# Patient Record
Sex: FEMALE | Race: BLACK OR AFRICAN AMERICAN | NOT HISPANIC OR LATINO | Employment: OTHER | URBAN - METROPOLITAN AREA
[De-identification: names, ages, dates, MRNs, and addresses within clinical notes are randomized per-mention and may not be internally consistent; named-entity substitution may affect disease eponyms.]

---

## 2020-10-21 ENCOUNTER — OFFICE VISIT (OUTPATIENT)
Dept: OPHTHALMOLOGY | Facility: CLINIC | Age: 85
End: 2020-10-21
Payer: MEDICARE

## 2020-10-21 DIAGNOSIS — Z01.818 PRE-OP TESTING: ICD-10-CM

## 2020-10-21 DIAGNOSIS — H18.20 CORNEAL EDEMA: Primary | ICD-10-CM

## 2020-10-21 PROCEDURE — 99999 PR PBB SHADOW E&M-EST. PATIENT-LVL III: CPT | Mod: PBBFAC,,, | Performed by: OPHTHALMOLOGY

## 2020-10-21 PROCEDURE — 92004 PR EYE EXAM, NEW PATIENT,COMPREHESV: ICD-10-PCS | Mod: S$PBB,,, | Performed by: OPHTHALMOLOGY

## 2020-10-21 PROCEDURE — 99999 PR PBB SHADOW E&M-EST. PATIENT-LVL III: ICD-10-PCS | Mod: PBBFAC,,, | Performed by: OPHTHALMOLOGY

## 2020-10-21 PROCEDURE — 92004 COMPRE OPH EXAM NEW PT 1/>: CPT | Mod: S$PBB,,, | Performed by: OPHTHALMOLOGY

## 2020-10-21 PROCEDURE — 99213 OFFICE O/P EST LOW 20 MIN: CPT | Mod: PBBFAC | Performed by: OPHTHALMOLOGY

## 2020-10-21 RX ORDER — CALCIUM CARBONATE 600 MG
600 TABLET ORAL DAILY
COMMUNITY

## 2020-10-21 RX ORDER — SODIUM CHLORIDE 0.9 % (FLUSH) 0.9 %
10 SYRINGE (ML) INJECTION
Status: SHIPPED | OUTPATIENT
Start: 2020-10-21

## 2020-10-21 RX ORDER — TETRACAINE HYDROCHLORIDE 5 MG/ML
1 SOLUTION OPHTHALMIC
Status: CANCELLED | OUTPATIENT
Start: 2020-10-21

## 2020-10-21 RX ORDER — VIT C/E/ZN/COPPR/LUTEIN/ZEAXAN 250MG-90MG
1000 CAPSULE ORAL DAILY
COMMUNITY

## 2020-10-21 RX ORDER — OFLOXACIN 3 MG/ML
1 SOLUTION/ DROPS OPHTHALMIC 4 TIMES DAILY
Qty: 1 BOTTLE | Refills: 4 | Status: SHIPPED | OUTPATIENT
Start: 2020-10-21 | End: 2022-10-06 | Stop reason: ALTCHOICE

## 2020-10-21 RX ORDER — LORATADINE 10 MG/1
10 TABLET ORAL DAILY
COMMUNITY

## 2020-10-21 RX ORDER — FLUTICASONE PROPIONATE 50 MCG
2 SPRAY, SUSPENSION (ML) NASAL DAILY
COMMUNITY
Start: 2019-12-16 | End: 2020-12-15

## 2020-10-21 RX ORDER — MULTIVIT WITH IRON,MINERALS
1 TABLET,CHEWABLE ORAL DAILY
COMMUNITY

## 2020-10-21 RX ORDER — MOXIFLOXACIN 5 MG/ML
1 SOLUTION/ DROPS OPHTHALMIC
Status: CANCELLED | OUTPATIENT
Start: 2020-10-21

## 2020-10-21 RX ORDER — ASPIRIN 81 MG/1
81 TABLET ORAL DAILY
COMMUNITY

## 2020-10-21 RX ORDER — PREDNISOLONE ACETATE 10 MG/ML
1 SUSPENSION/ DROPS OPHTHALMIC 4 TIMES DAILY
Qty: 10 ML | Refills: 4 | Status: SHIPPED | OUTPATIENT
Start: 2020-10-21 | End: 2022-10-06 | Stop reason: ALTCHOICE

## 2020-10-21 RX ORDER — ZINC GLUCONATE 50 MG
50 TABLET ORAL DAILY
COMMUNITY

## 2020-10-21 RX ORDER — TIMOLOL MALEATE 5 MG/ML
1 SOLUTION/ DROPS OPHTHALMIC DAILY
COMMUNITY
Start: 2020-10-16

## 2020-10-21 RX ORDER — GLUCOSAM/CHONDRO/HERB 149/HYAL 750-100 MG
1 TABLET ORAL DAILY
COMMUNITY

## 2020-10-21 NOTE — LETTER
October 21, 2020      Clayton Hussein MD  [address] [facility]  Eye Care & Surgery [facility] [place] [address]           [facility] 1st Fl  [address]  [facility] [address]  Phone: [phone]  Fax: [phone]          Patient: Jojo Pual   MR Number: [id number]   YOB: 1929   Date of Visit: 10/21/2020       Dear Dr. Clayton Hussein:    Thank you for referring Jojo Paul to me for evaluation. Attached you will find relevant portions of my assessment and plan of care.    If you have questions, please do not hesitate to call me. I look forward to following Jojo Paul along with you.    Sincerely,    Lelo Escalante MD    Enclosure  CC:  No Recipients    If you would like to receive this communication electronically, please contact [email or web address] or [phone] to request more information on [employer] Link access.    For providers and/or their staff who would like to refer a patient to [facility], please contact us through our one-stop-shop provider referral line, [facility], at [phone].    If you feel you have received this communication in error or would no longer like to receive these types of communications, please e-mail [email or web address]

## 2020-10-21 NOTE — PROGRESS NOTES
HPI     89 YO female here with niece for OS eval DSEK per Dr. Hussein. Pt reports   cataract sx ou and glaucoma laser OU. Pt states she notices an increase in   floaters for past few months. Hx of eye infection OD--cured with abx gtts.   Pt states she has dry eyes but does not use eye gtts for this. Referred   here for dsek os.     Timolol OU qd    Last edited by Marisa Bueno on 10/21/2020  2:47 PM. (History)            Assessment /Plan     For exam results, see Encounter Report.    Corneal edema      Clinically significant corneal edema is present, which affects vision and activities of daily living. Risks, benefits, and alternatives to surgery were discussed and patient voices understanding.    DMEK left eye

## 2020-10-23 ENCOUNTER — TELEPHONE (OUTPATIENT)
Dept: OPHTHALMOLOGY | Facility: CLINIC | Age: 85
End: 2020-10-23

## 2020-10-23 NOTE — TELEPHONE ENCOUNTER
I spoke to patient.  She wants to cancel surgery.  Feels she is too old to pursue surgery.  Will tin back if she changes mind.

## 2022-10-06 ENCOUNTER — OFFICE VISIT (OUTPATIENT)
Dept: OPHTHALMOLOGY | Facility: CLINIC | Age: 87
End: 2022-10-06
Payer: MEDICARE

## 2022-10-06 DIAGNOSIS — H18.20 CORNEAL EDEMA: Primary | ICD-10-CM

## 2022-10-06 PROCEDURE — 99214 OFFICE O/P EST MOD 30 MIN: CPT | Mod: S$PBB,,, | Performed by: OPHTHALMOLOGY

## 2022-10-06 PROCEDURE — 92071 CONTACT LENS FITTING FOR TX: CPT | Mod: PBBFAC | Performed by: OPHTHALMOLOGY

## 2022-10-06 PROCEDURE — 92071 CONTACT LENS FITTING FOR TX: CPT | Mod: S$PBB,LT,, | Performed by: OPHTHALMOLOGY

## 2022-10-06 PROCEDURE — 99212 OFFICE O/P EST SF 10 MIN: CPT | Mod: PBBFAC | Performed by: OPHTHALMOLOGY

## 2022-10-06 PROCEDURE — 99214 PR OFFICE/OUTPT VISIT, EST, LEVL IV, 30-39 MIN: ICD-10-PCS | Mod: S$PBB,,, | Performed by: OPHTHALMOLOGY

## 2022-10-06 PROCEDURE — 92071 PR CONTACT LENS FITTING FOR TX: ICD-10-PCS | Mod: S$PBB,LT,, | Performed by: OPHTHALMOLOGY

## 2022-10-06 PROCEDURE — 99999 PR PBB SHADOW E&M-EST. PATIENT-LVL II: CPT | Mod: PBBFAC,,, | Performed by: OPHTHALMOLOGY

## 2022-10-06 PROCEDURE — 99999 PR PBB SHADOW E&M-EST. PATIENT-LVL II: ICD-10-PCS | Mod: PBBFAC,,, | Performed by: OPHTHALMOLOGY

## 2022-10-06 RX ORDER — OFLOXACIN 3 MG/ML
1 SOLUTION/ DROPS OPHTHALMIC 2 TIMES DAILY
Qty: 5 ML | Refills: 11 | Status: SHIPPED | OUTPATIENT
Start: 2022-10-06 | End: 2022-10-16

## 2022-10-06 RX ORDER — LATANOPROST 50 UG/ML
1 SOLUTION/ DROPS OPHTHALMIC NIGHTLY
COMMUNITY
Start: 2022-09-26

## 2022-10-06 RX ORDER — FLUTICASONE PROPIONATE 50 MCG
2 SPRAY, SUSPENSION (ML) NASAL DAILY
COMMUNITY
Start: 2022-04-14

## 2022-10-06 RX ORDER — MULTIVITAMIN
1 TABLET ORAL
COMMUNITY

## 2022-10-06 RX ORDER — TOBRAMYCIN AND DEXAMETHASONE 3; 1 MG/ML; MG/ML
1 SUSPENSION/ DROPS OPHTHALMIC 4 TIMES DAILY
COMMUNITY
Start: 2022-09-08 | End: 2023-04-27 | Stop reason: ALTCHOICE

## 2022-10-06 NOTE — PROGRESS NOTES
"HPI    Referred by Dr. Mai     Pbk OS  S/p CE OU x 15+yrs  S/p Laser for Glaucoma OU x 20+yrs     Emre 128 5% tid OS  Emre 5% 128 OS gtts and lenny   Latanoprost OU qhs  Tobradex OS QID   Refresh tears when wakes during the night.       Corneal eval   89 yr old pt presents for evaluation for DSEK. She states that a year ago   she had an appt with Dr. Escalante for surgery and was nervous so she canceled.    Now left eye vision is not as good, but still able to function. She is   bothered by glare form the sun.  Uses the Emre 128 often throughout the   day           Last edited by Morenita Ahn MD on 10/6/2022  4:52 PM.            Assessment /Plan     For exam results, see Encounter Report.    Corneal edema    Other orders  -     ofloxacin (OCUFLOX) 0.3 % ophthalmic solution; Place 1 drop into the left eye 2 (two) times a day. for 10 days  Dispense: 5 mL; Refill: 11    PBK OS - causing blurred vision and foreign body sensation  - pt hesitant to proceed with surgery, more concerned about the grit" sensation   - will place a bandage contact lens today, stop TobraDex, start Ocuflox b.i.d.    Will call patient in a few weeks to see how left eye is feeling.  If she is feeling better, okay to follow up with Dr. Mai monthly for a contact lens changes    S/p CE OU x 15+yrs    S/p Laser for Glaucoma OU x 20+yrs   - needs DFE / nerve exame               "

## 2022-10-13 ENCOUNTER — TELEPHONE (OUTPATIENT)
Dept: OPHTHALMOLOGY | Facility: CLINIC | Age: 87
End: 2022-10-13
Payer: MEDICARE

## 2022-10-27 ENCOUNTER — OFFICE VISIT (OUTPATIENT)
Dept: OPHTHALMOLOGY | Facility: CLINIC | Age: 87
End: 2022-10-27
Payer: MEDICARE

## 2022-10-27 DIAGNOSIS — H18.20 CORNEAL EDEMA: Primary | ICD-10-CM

## 2022-10-27 PROCEDURE — 92071 PR CONTACT LENS FITTING FOR TX: ICD-10-PCS | Mod: S$PBB,LT,, | Performed by: OPHTHALMOLOGY

## 2022-10-27 PROCEDURE — 99214 OFFICE O/P EST MOD 30 MIN: CPT | Mod: S$PBB,,, | Performed by: OPHTHALMOLOGY

## 2022-10-27 PROCEDURE — 92071 CONTACT LENS FITTING FOR TX: CPT | Mod: S$PBB,LT,, | Performed by: OPHTHALMOLOGY

## 2022-10-27 PROCEDURE — 92071 CONTACT LENS FITTING FOR TX: CPT | Mod: PBBFAC,27 | Performed by: OPHTHALMOLOGY

## 2022-10-27 PROCEDURE — 99214 OFFICE O/P EST MOD 30 MIN: CPT | Mod: PBBFAC

## 2022-10-27 PROCEDURE — 99214 PR OFFICE/OUTPT VISIT, EST, LEVL IV, 30-39 MIN: ICD-10-PCS | Mod: S$PBB,,, | Performed by: OPHTHALMOLOGY

## 2022-10-27 NOTE — PROGRESS NOTES
"HPI    Referred by Dr. Willis     Pbk OS  S/p CE OU x 15+yrs  S/p Laser for Glaucoma OU x 20+yrs     Emre 128 5% tid OS  Emre 5% 128 OS gtts and lenny   Latanoprost OU qhs  ofloxacin OS QID   Refresh tears when wakes during the night.     Pt here for f/u per dr wilils  Pt was doing better after completing course of ofloxacin but irritation   started back up 2 days later          Last edited by Alissa Lilly on 10/27/2022  1:16 PM.            Assessment /Plan     For exam results, see Encounter Report.    Corneal edema      PBK OS - causing blurred vision and foreign body sensation  - pt hesitant to proceed with surgery, more concerned about the grit" sensation   - was initially doing well with a bandage contact lens - however more FBS now -- suspect due to tight fitting lens.  - will replace with a new bandage contact lens today OS.  Continue Ocuflox     Will call patient in a few weeks to see how left eye is feeling.  If she is feeling better, okay to follow up with Dr. Willis monthly for a contact lens changes    S/p CE OU x 15+yrs    S/p Laser for Glaucoma OU x 20+yrs   - needs DFE / nerve exame                   "

## 2022-12-05 ENCOUNTER — TELEPHONE (OUTPATIENT)
Dept: OPHTHALMOLOGY | Facility: CLINIC | Age: 87
End: 2022-12-05
Payer: MEDICARE

## 2022-12-05 NOTE — TELEPHONE ENCOUNTER
----- Message from Anabella Kwan sent at 12/5/2022  1:21 PM CST -----  Regarding: urgent appt  Patient states that she had a  lot of issues with her eye over the weekend.  Her left eye is very swollen where she is unable to open it and it has a lot of discharge.

## 2022-12-05 NOTE — TELEPHONE ENCOUNTER
Pt states she has a lot of discharge and her left eye is swollen. She states she is using her Emre 128, Ofloxacin, and Refresh with very little relief. Appt made.

## 2022-12-09 ENCOUNTER — OFFICE VISIT (OUTPATIENT)
Dept: OPHTHALMOLOGY | Facility: CLINIC | Age: 87
End: 2022-12-09
Payer: MEDICARE

## 2022-12-09 DIAGNOSIS — H18.20 CORNEAL EDEMA: Primary | ICD-10-CM

## 2022-12-09 PROCEDURE — 99214 PR OFFICE/OUTPT VISIT, EST, LEVL IV, 30-39 MIN: ICD-10-PCS | Mod: S$PBB,,, | Performed by: OPHTHALMOLOGY

## 2022-12-09 PROCEDURE — 99214 OFFICE O/P EST MOD 30 MIN: CPT | Mod: S$PBB,,, | Performed by: OPHTHALMOLOGY

## 2022-12-09 PROCEDURE — 92071 CONTACT LENS FITTING FOR TX: CPT | Mod: S$PBB,LT,, | Performed by: OPHTHALMOLOGY

## 2022-12-09 PROCEDURE — 99999 PR PBB SHADOW E&M-EST. PATIENT-LVL III: CPT | Mod: PBBFAC,,, | Performed by: OPHTHALMOLOGY

## 2022-12-09 PROCEDURE — 99999 PR PBB SHADOW E&M-EST. PATIENT-LVL III: ICD-10-PCS | Mod: PBBFAC,,, | Performed by: OPHTHALMOLOGY

## 2022-12-09 PROCEDURE — 92071 CONTACT LENS FITTING FOR TX: CPT | Mod: PBBFAC,27,LT | Performed by: OPHTHALMOLOGY

## 2022-12-09 PROCEDURE — 99213 OFFICE O/P EST LOW 20 MIN: CPT | Mod: PBBFAC,25,27 | Performed by: OPHTHALMOLOGY

## 2022-12-09 PROCEDURE — 92071 PR CONTACT LENS FITTING FOR TX: ICD-10-PCS | Mod: S$PBB,LT,, | Performed by: OPHTHALMOLOGY

## 2022-12-09 NOTE — PROGRESS NOTES
"HPI    Referred by Dr. Mai     Pbk OS   S/p CE OU x 15+yrs   S/p Laser for Glaucoma OU x 20+yrs     Emre 128 5% QID OS   Latanoprost OU qhs   Timolol qd OU   Ofloxacin QID OS   Refresh tears 6+ times OU     89 y.o. female is here for corneal edema, left eye. Last Friday left eye   started to become bothersome and expericing eye pain and swelling. FB   sensation, left eye. Pt states that the eye was so swollen she could not   open the lids, left eye with her hand. Itching. Do have trouble with   glare.     Last edited by BERTIN Montgomery on 12/9/2022  1:46 PM.            Assessment /Plan     For exam results, see Encounter Report.    Corneal edema          PBK OS - causing blurred vision and foreign body sensation  - pt hesitant to proceed with surgery, more concerned about the grit" sensation   - does well with a bandage contact lens for 4 wks then it becomes tight fitting with pain.    -->  will replace with a new bandage contact lens today OS.  Continue Ocuflox BID.       follow up with Dr. Mai monthly for a contact lens changes    S/p CE OU x 15+yrs    S/p Laser for Glaucoma OU x 20+yrs   - needs DFE / nerve exame                     "

## 2022-12-09 NOTE — LETTER
"     [facility] - 10th Fl  [address]  [facility] [address]  Phone: [phone]  Fax: [phone]   December 9, 2022    Ac Mai MD  [facility]  [address]  [place] [address]    Patient: Jojo Paul   MR Number: [id number]   YOB: 1929   Date of Visit: 12/9/2022       Dear Dr. Mai:    Thank you for referring Jojo Paul to me for evaluation. Here is my assessment and plan of care:    PBK OS - causing blurred vision and foreign body sensation  - pt hesitant to proceed with surgery, more concerned about the grit" sensation   - does well with a bandage contact lens for 4 wks then it becomes tight fitting with pain.    -->  will replace with a new bandage contact lens today OS.  Continue Ocuflox BID.       follow up with Dr. Mai monthly for a contact lens changes    S/p CE OU x 15+yrs    S/p Laser for Glaucoma OU x 20+yrs   - needs DFE / nerve exame    Below you will find my full exam findings. If you have questions, please do not hesitate to call me. I look forward to following Ms. Jojo Paul along with you.    Sincerely,        Morenita Ahn MD       CC  No Recipients             Base Eye Exam       Visual Acuity (Snellen - Linear)         Right Left    Dist cc 20/40 -2 CF @ Face      Correction: Glasses, Contacts   Bandage CTL, left eye             Tonometry (Tonopen, 2:10 PM)         Right Left    Pressure  12              Neuro/Psych       Oriented x3: Yes    Mood/Affect: Normal                  Slit Lamp and Fundus Exam       Slit Lamp Exam         Right Left    Lids/Lashes ptosis  ptosis, bleph, edema    Conjunctiva/Sclera White and quiet Injection    Cornea Clear edema, haze 2+ folds, bcl    Anterior Chamber Deep and quiet Deep and quiet    Iris Round and reactive Round and reactive    Lens PCIOL PCIOL                     "

## 2022-12-16 ENCOUNTER — TELEPHONE (OUTPATIENT)
Dept: OPHTHALMOLOGY | Facility: CLINIC | Age: 87
End: 2022-12-16
Payer: MEDICARE

## 2022-12-19 ENCOUNTER — TELEPHONE (OUTPATIENT)
Dept: OPHTHALMOLOGY | Facility: CLINIC | Age: 87
End: 2022-12-19
Payer: MEDICARE

## 2022-12-21 ENCOUNTER — TELEPHONE (OUTPATIENT)
Dept: OPHTHALMOLOGY | Facility: CLINIC | Age: 87
End: 2022-12-21
Payer: MEDICARE

## 2022-12-21 NOTE — TELEPHONE ENCOUNTER
----- Message from Renea Brewer sent at 12/21/2022  3:19 PM CST -----  Regarding: Questions  Pt wants to speak with a tech. They had a missed call from Optometry today. Pt wants to know if there is anything important they need to know from Dr. Ahn.      Jojo @  [phone]

## 2022-12-23 ENCOUNTER — OFFICE VISIT (OUTPATIENT)
Dept: OPHTHALMOLOGY | Facility: CLINIC | Age: 87
End: 2022-12-23
Payer: MEDICARE

## 2022-12-23 DIAGNOSIS — H18.20 CORNEAL EDEMA: Primary | ICD-10-CM

## 2022-12-23 PROCEDURE — 99214 PR OFFICE/OUTPT VISIT, EST, LEVL IV, 30-39 MIN: ICD-10-PCS | Mod: S$PBB,,, | Performed by: OPHTHALMOLOGY

## 2022-12-23 PROCEDURE — 99214 OFFICE O/P EST MOD 30 MIN: CPT | Mod: S$PBB,,, | Performed by: OPHTHALMOLOGY

## 2022-12-23 NOTE — PROGRESS NOTES
"HPI    Referred by Dr. Mai     Pbk OS   S/p CE OU x 15+yrs   S/p Laser for Glaucoma OU x 20+yrs     Emre 128 5% QID OS --> PRN  Latanoprost OU qhs   Timolol qd OU   Ofloxacin BID OS   Refresh tears 6+ times OU     Pt here to discuss OS eye problem. FBS OS. Pt states that the eye was so   swollen she could not open the lid.     Last edited by Alissa Lilly on 12/23/2022  2:15 PM.            Assessment /Plan     For exam results, see Encounter Report.    Corneal edema        PBK OS - causing blurred vision and foreign body sensation  - pt hesitant to proceed with surgery, more concerned about the grit" sensation   - does well with a bandage contact lens for 4 wks then it becomes tight fitting with pain.    BCL that was placed at last visit as fallen out -- will replace today    -->  will replace with a new bandage contact lens today OS.  Continue Ocuflox BID.       follow up with Dr. Mai monthly or (q3 wk) -- contact lens changes    S/p CE OU x 15+yrs    S/p Laser for Glaucoma OU x 20+yrs   - needs DFE / nerve exam                     "

## 2023-03-01 ENCOUNTER — TELEPHONE (OUTPATIENT)
Dept: OPHTHALMOLOGY | Facility: CLINIC | Age: 88
End: 2023-03-01
Payer: MEDICARE

## 2023-03-01 NOTE — TELEPHONE ENCOUNTER
----- Message from Renea Brewer sent at 3/1/2023  1:18 PM CST -----  Regarding: Appointment  Pt wants to schedule a follow up appt before 03/31. They were advised by Dr. Ac Phelan to see Dr. Ahn before they see him. Please contact pt to assist.      Jojo @ [phone]

## 2023-03-06 ENCOUNTER — OFFICE VISIT (OUTPATIENT)
Dept: OPHTHALMOLOGY | Facility: CLINIC | Age: 88
End: 2023-03-06
Payer: MEDICARE

## 2023-03-06 ENCOUNTER — TELEPHONE (OUTPATIENT)
Dept: OPTOMETRY | Facility: CLINIC | Age: 88
End: 2023-03-06
Payer: MEDICARE

## 2023-03-06 DIAGNOSIS — H18.20 CORNEAL EDEMA: Primary | ICD-10-CM

## 2023-03-06 PROCEDURE — 99214 OFFICE O/P EST MOD 30 MIN: CPT | Mod: S$PBB,,, | Performed by: OPHTHALMOLOGY

## 2023-03-06 PROCEDURE — 99214 PR OFFICE/OUTPT VISIT, EST, LEVL IV, 30-39 MIN: ICD-10-PCS | Mod: S$PBB,,, | Performed by: OPHTHALMOLOGY

## 2023-03-06 NOTE — TELEPHONE ENCOUNTER
----- Message from Maxine Radford OD sent at 3/6/2023  4:28 PM CST -----  Regarding: RE: 1 mo clfit  3/30 in the PM is fine just let me know what time she wants and I will put her down!  ----- Message -----  From: Molly Erickson  Sent: 3/6/2023   4:02 PM CST  To: Maxine Radford OD  Subject: FW: 1 mo clfit                                   Hey doc, I don't see any spots available on the 30 for a fit, just a few follow up spots. Is there another date that would work best for you? Just wanted to ask because I know it has to be on certain date and times as well as double booked.    ----- Message -----  From: Gil Joseph MA  Sent: 3/6/2023   3:32 PM CST  To: Maxine Radford Staff  Subject: 1 mo clfit                                       Patient needs a schedule a clfit per Anabelle in 1 month. Pt is interested in scheduling appointment for 03/30/23 in the afternoon. Please call [phone] to schedule.       Thanks,  Gil

## 2023-03-06 NOTE — PROGRESS NOTES
"HPI    Referred by Dr. Mai     Pbk OS   S/p CE OU x 15+yrs   S/p Laser for Glaucoma OU x 20+yrs     Emre 128 5% QID OS --> PRN  Latanoprost OU qhs   Timolol qd OU   Ofloxacin BID OS   Refresh tears 6+ times OU     Pt here today for corneal edema f/u. Pt denies eye pain but there is some   discomfort. Pt states vision is blurry, sensitive to light, and watering.   Pt states the there has been no change to symptoms. Pt stated the bandaged   CL fell out and last Monday Dr. Mai put a new one in.  Last edited by Nicky Cuevas MA on 3/6/2023 11:38 AM.            Assessment /Plan     For exam results, see Encounter Report.    Corneal edema          PBK OS - causing blurred vision and foreign body sensation  - pt does not want surgery, more concerned about the grit" sensation   - does well when BCL fitting well -- sometimes it moves around/ falls out     Continue Ocuflox 2-4 x/day    F/up optom to check BCL fit, maybe needs different size/D     follow up with Dr. Mai monthly or (q3 wk) -- contact lens changes    S/p CE OU x 15+yrs    S/p Laser for Glaucoma OU x 20+yrs   - needs DFE / nerve exam                   "

## 2023-03-30 ENCOUNTER — OFFICE VISIT (OUTPATIENT)
Dept: OPTOMETRY | Facility: CLINIC | Age: 88
End: 2023-03-30
Payer: MEDICARE

## 2023-03-30 DIAGNOSIS — H18.20 CORNEAL EDEMA: Primary | ICD-10-CM

## 2023-03-30 PROCEDURE — 99499 NO LOS: ICD-10-PCS | Mod: S$PBB,,, | Performed by: OPTOMETRIST

## 2023-03-30 PROCEDURE — 99499 UNLISTED E&M SERVICE: CPT | Mod: S$PBB,,, | Performed by: OPTOMETRIST

## 2023-03-31 NOTE — PROGRESS NOTES
"HPI    The patient reports for she is here for a follow up for BCL. Reports that   OS is feeling okay today, no longer having FBS. Reports problems when BCL   is dry or out of place, states BCL placed by Dr. Ahn and Dr. Mai   falls out or moves out of place and would like to try a better fitting   BCL. States she is noticing pain when she touches OS, lids feel swollen   OS. States she has a hard time lifting OS to instill gtts, is placing into   corner of eye.   Ofloxacin tid-qid OS.  Latanoprost qhs OU.  T1/2 bid OU.  Refresh ATs prn (using q1-3 hours) OU.   No longer using Emre 128, told to use as last resort.  Seeing Dr. Mai next on 3/31/2023.  Last edited by Cyndi Meredith, OD on 3/30/2023  2:27 PM.            Assessment /Plan     For exam results, see Encounter Report.    Corneal edema      PBK OS - causing blurred vision and foreign body sensation  - pt does not want surgery, more concerned about the grit" sensation   - does well when BCL fitting well -- sometimes it moves around/ falls out    Pt was referred by Dr. Ahn for BCL assessment/swap to check BCL fit, maybe needs different size/D due to pt symptoms. Upon examination, it was discovered pt had 2 BCLs in OS possibly accounting for pt's symptoms. Removed both lenses without complication and placed new [employer]ix N&D 8.4 BC BCL in office without complication.  Continue Ocuflox 2-4 x/day    Follow up with myself or Dr. Mai monthly or (q3 wk) -- contact lens changes  "

## 2023-04-27 ENCOUNTER — OFFICE VISIT (OUTPATIENT)
Dept: OPTOMETRY | Facility: CLINIC | Age: 88
End: 2023-04-27
Payer: MEDICARE

## 2023-04-27 DIAGNOSIS — H18.20 CORNEAL EDEMA: Primary | ICD-10-CM

## 2023-04-27 PROCEDURE — 99213 OFFICE O/P EST LOW 20 MIN: CPT | Mod: PBBFAC | Performed by: OPTOMETRIST

## 2023-04-27 PROCEDURE — 99999 PR PBB SHADOW E&M-EST. PATIENT-LVL III: CPT | Mod: PBBFAC,,, | Performed by: OPTOMETRIST

## 2023-04-27 PROCEDURE — 99499 NO LOS: ICD-10-PCS | Mod: S$PBB,,, | Performed by: OPTOMETRIST

## 2023-04-27 PROCEDURE — 99999 PR PBB SHADOW E&M-EST. PATIENT-LVL III: ICD-10-PCS | Mod: PBBFAC,,, | Performed by: OPTOMETRIST

## 2023-04-27 PROCEDURE — 99499 UNLISTED E&M SERVICE: CPT | Mod: S$PBB,,, | Performed by: OPTOMETRIST

## 2023-04-27 RX ORDER — OFLOXACIN 3 MG/ML
1 SOLUTION/ DROPS OPHTHALMIC 3 TIMES DAILY
COMMUNITY
Start: 2023-03-31 | End: 2023-04-27 | Stop reason: SDUPTHER

## 2023-04-27 RX ORDER — OFLOXACIN 3 MG/ML
1 SOLUTION/ DROPS OPHTHALMIC 3 TIMES DAILY
Qty: 5 ML | Refills: 11 | Status: SHIPPED | OUTPATIENT
Start: 2023-04-27 | End: 2023-12-11 | Stop reason: SDUPTHER

## 2023-04-27 RX ORDER — CARBOXYMETHYLCELLULOSE SODIUM AND GLYCERIN 5; 9 MG/ML; MG/ML
1 SOLUTION/ DROPS OPHTHALMIC 4 TIMES DAILY
COMMUNITY
Start: 2023-03-31

## 2023-04-27 NOTE — PROGRESS NOTES
"HPI    DLS: 3/30/2023 Dr. Radford  89 y.o. female is here for Bandage contact lens exchange, left eye. Denies   eye pain. FB sensation, left eye. Pt states that the contact lens moves   around. Uses warm compresses late evening when needed. Left eye.  Upon awakening, notices crusting along the left lower lid.   Eye Meds: Refresh QID OU (sometimes OS only)                     Latanoprost qhs OU                      Timolol qAM OU                      Ofloxacin  TID-QID OS   Last edited by Maxine Radford, OD on 4/27/2023  3:44 PM.            Assessment /Plan     For exam results, see Encounter Report.    Corneal edema  -     ofloxacin (OCUFLOX) 0.3 % ophthalmic solution; Place 1 drop into the left eye 3 (three) times daily.  Dispense: 5 mL; Refill: 11      Contact Lens Current Rx       Current Contact Lens Rx         Brand Base Curve Diameter Sphere Cylinder Lens Centration Movement    Right            Left Dailies Total 1 8.5 14.1 +0.50 Sphere Bandage contact lens Well-centered Adequate                   PBK OS - causing blurred vision and foreign body sensation  - pt does not want surgery, more concerned about the grit" sensation   - does well when BCL fitting well -- sometimes it moves around/ falls out    Pt was referred by Dr. Ahn for BCL assessment/swap to check BCL fit, maybe needs different size/D due to pt symptoms.   Placed new DT1 BCL in office without complication.  Continue Ocuflox 2-4 x/day    Follow up with myself or Dr. Mai monthly or (q3 wk) -- contact lens changes  "

## 2023-05-26 ENCOUNTER — OFFICE VISIT (OUTPATIENT)
Dept: OPTOMETRY | Facility: CLINIC | Age: 88
End: 2023-05-26
Payer: MEDICARE

## 2023-05-26 DIAGNOSIS — H18.20 CORNEAL EDEMA: Primary | ICD-10-CM

## 2023-05-26 PROCEDURE — 99499 UNLISTED E&M SERVICE: CPT | Mod: S$PBB,,, | Performed by: OPTOMETRIST

## 2023-05-26 PROCEDURE — 99499 NO LOS: ICD-10-PCS | Mod: S$PBB,,, | Performed by: OPTOMETRIST

## 2023-05-26 NOTE — PROGRESS NOTES
"HPI    BCL Swap  DLS: 04/27/23  89 y.o. is here for bcl swap  No complaints, compliant on gtt regimen  Last edited by Maxine Radford, OD on 5/26/2023  1:35 PM.            Assessment /Plan     For exam results, see Encounter Report.    Corneal edema      Contact Lens Current Rx       Current Contact Lens Rx         Brand Base Curve Diameter Sphere Cylinder Lens Centration Movement    Right            Left Dailies Total 1 8.5 14.1 +0.50 Sphere Bandage contact lens Well-centered Adequate                    PBK OS - causing blurred vision and foreign body sensation  - pt does not want surgery, more concerned about the grit" sensation   - does well when BCL fitting well -- sometimes it moves around/ falls out    Pt was referred by Dr. Ahn for BCL assessment/swap to check BCL fit, maybe needs different size/D due to pt symptoms.   Placed new DT1 BCL in office without complication.  Continue Ocuflox 2-4 x/day    Follow up with myself or Dr. Mai monthly or (q3 wk) -- contact lens changes  "

## 2023-08-24 ENCOUNTER — TELEPHONE (OUTPATIENT)
Dept: OPTOMETRY | Facility: CLINIC | Age: 88
End: 2023-08-24
Payer: MEDICARE

## 2023-09-01 ENCOUNTER — OFFICE VISIT (OUTPATIENT)
Dept: OPTOMETRY | Facility: CLINIC | Age: 88
End: 2023-09-01
Payer: MEDICARE

## 2023-09-01 DIAGNOSIS — H18.20 CORNEAL EDEMA: Primary | ICD-10-CM

## 2023-09-01 PROCEDURE — 99499 NO LOS: ICD-10-PCS | Mod: S$PBB,,, | Performed by: OPTOMETRIST

## 2023-09-01 PROCEDURE — 99499 UNLISTED E&M SERVICE: CPT | Mod: S$PBB,,, | Performed by: OPTOMETRIST

## 2023-10-02 ENCOUNTER — OFFICE VISIT (OUTPATIENT)
Dept: OPTOMETRY | Facility: CLINIC | Age: 88
End: 2023-10-02
Payer: MEDICARE

## 2023-10-02 DIAGNOSIS — H18.20 CORNEAL EDEMA: ICD-10-CM

## 2023-10-02 DIAGNOSIS — H59.012 PSEUDOPHAKIC BULLOUS KERATOPATHY OF LEFT EYE: Primary | ICD-10-CM

## 2023-10-02 PROCEDURE — 99999 PR PBB SHADOW E&M-EST. PATIENT-LVL III: ICD-10-PCS | Mod: PBBFAC,,, | Performed by: OPTOMETRIST

## 2023-10-02 PROCEDURE — 92071 PR CONTACT LENS FITTING FOR TX: ICD-10-PCS | Mod: S$PBB,LT,, | Performed by: OPTOMETRIST

## 2023-10-02 PROCEDURE — 99214 PR OFFICE/OUTPT VISIT, EST, LEVL IV, 30-39 MIN: ICD-10-PCS | Mod: S$PBB,,, | Performed by: OPTOMETRIST

## 2023-10-02 PROCEDURE — 99214 OFFICE O/P EST MOD 30 MIN: CPT | Mod: S$PBB,,, | Performed by: OPTOMETRIST

## 2023-10-02 PROCEDURE — 99213 OFFICE O/P EST LOW 20 MIN: CPT | Mod: PBBFAC | Performed by: OPTOMETRIST

## 2023-10-02 PROCEDURE — 92071 CONTACT LENS FITTING FOR TX: CPT | Mod: S$PBB,LT,, | Performed by: OPTOMETRIST

## 2023-10-02 PROCEDURE — 99999 PR PBB SHADOW E&M-EST. PATIENT-LVL III: CPT | Mod: PBBFAC,,, | Performed by: OPTOMETRIST

## 2023-10-02 PROCEDURE — 92071 CONTACT LENS FITTING FOR TX: CPT | Mod: PBBFAC | Performed by: OPTOMETRIST

## 2023-10-02 NOTE — PROGRESS NOTES
HPI    Pt presents with BCL   Pt reports eye running/ gritty feeling   Pt reports Refresh 4+ times a day   Pt reports good compliance Ofloxacin   Pt has an alarm set as to not miss doses     POAG   Pt reports good compliance c Latanoprost/ Timolol   Last edited by Radha Suh on 10/2/2023 10:02 AM.            Assessment /Plan     For exam results, see Encounter Report.    Pseudophakic bullous keratopathy of left eye  Corneal edema  -Switch BCL to Total 30  -Ocuflox QD  -Timolol, Latanoprost OU Leader as scheduled      RTC BCL exchange

## 2023-11-13 ENCOUNTER — OFFICE VISIT (OUTPATIENT)
Dept: OPTOMETRY | Facility: CLINIC | Age: 88
End: 2023-11-13
Payer: MEDICARE

## 2023-11-13 DIAGNOSIS — H59.012 PSEUDOPHAKIC BULLOUS KERATOPATHY OF LEFT EYE: Primary | ICD-10-CM

## 2023-11-13 DIAGNOSIS — H02.88A MEIBOMIAN GLAND DYSFUNCTION (MGD), BILATERAL, BOTH UPPER AND LOWER LIDS: ICD-10-CM

## 2023-11-13 DIAGNOSIS — H02.88B MEIBOMIAN GLAND DYSFUNCTION (MGD), BILATERAL, BOTH UPPER AND LOWER LIDS: ICD-10-CM

## 2023-11-13 PROCEDURE — 99999 PR PBB SHADOW E&M-EST. PATIENT-LVL III: ICD-10-PCS | Mod: PBBFAC,,, | Performed by: OPTOMETRIST

## 2023-11-13 PROCEDURE — 92071 CONTACT LENS FITTING FOR TX: CPT | Mod: PBBFAC,LT | Performed by: OPTOMETRIST

## 2023-11-13 PROCEDURE — 92071 CONTACT LENS FITTING FOR TX: CPT | Mod: S$PBB,LT,, | Performed by: OPTOMETRIST

## 2023-11-13 PROCEDURE — 99999 PR PBB SHADOW E&M-EST. PATIENT-LVL III: CPT | Mod: PBBFAC,,, | Performed by: OPTOMETRIST

## 2023-11-13 PROCEDURE — 92071 PR CONTACT LENS FITTING FOR TX: ICD-10-PCS | Mod: S$PBB,LT,, | Performed by: OPTOMETRIST

## 2023-11-13 PROCEDURE — 92012 PR EYE EXAM, EST PATIENT,INTERMED: ICD-10-PCS | Mod: S$PBB,,, | Performed by: OPTOMETRIST

## 2023-11-13 PROCEDURE — 92012 INTRM OPH EXAM EST PATIENT: CPT | Mod: S$PBB,,, | Performed by: OPTOMETRIST

## 2023-11-13 PROCEDURE — 99213 OFFICE O/P EST LOW 20 MIN: CPT | Mod: PBBFAC | Performed by: OPTOMETRIST

## 2023-11-13 NOTE — PROGRESS NOTES
HPI    Bandage swap for Bullous Keratopathy   Total 30 +0.50  Pt reports comfort is okay   Pt reprots some goop in the eye     Last edited by Valdez Clinton, OD on 11/13/2023 10:23 AM.            Assessment /Plan     For exam results, see Encounter Report.    Pseudophakic bullous keratopathy of left eye  -BCL swap  -Good initial comfort    Meibomian gland dysfunction (MGD), bilateral, both upper and lower lids  -lid scrubs     RTC 1 mo

## 2023-12-11 ENCOUNTER — TELEPHONE (OUTPATIENT)
Dept: OPTOMETRY | Facility: CLINIC | Age: 88
End: 2023-12-11
Payer: MEDICARE

## 2023-12-11 ENCOUNTER — OFFICE VISIT (OUTPATIENT)
Dept: OPTOMETRY | Facility: CLINIC | Age: 88
End: 2023-12-11
Payer: MEDICARE

## 2023-12-11 DIAGNOSIS — H18.20 CORNEAL EDEMA: ICD-10-CM

## 2023-12-11 DIAGNOSIS — H59.012 PSEUDOPHAKIC BULLOUS KERATOPATHY OF LEFT EYE: Primary | ICD-10-CM

## 2023-12-11 PROCEDURE — 99999 PR PBB SHADOW E&M-EST. PATIENT-LVL I: ICD-10-PCS | Mod: PBBFAC,,, | Performed by: OPTOMETRIST

## 2023-12-11 PROCEDURE — 99214 PR OFFICE/OUTPT VISIT, EST, LEVL IV, 30-39 MIN: ICD-10-PCS | Mod: S$PBB,,, | Performed by: OPTOMETRIST

## 2023-12-11 PROCEDURE — 99214 OFFICE O/P EST MOD 30 MIN: CPT | Mod: S$PBB,,, | Performed by: OPTOMETRIST

## 2023-12-11 PROCEDURE — 99999 PR PBB SHADOW E&M-EST. PATIENT-LVL I: CPT | Mod: PBBFAC,,, | Performed by: OPTOMETRIST

## 2023-12-11 PROCEDURE — 99211 OFF/OP EST MAY X REQ PHY/QHP: CPT | Mod: PBBFAC | Performed by: OPTOMETRIST

## 2023-12-11 RX ORDER — OFLOXACIN 3 MG/ML
1 SOLUTION/ DROPS OPHTHALMIC 3 TIMES DAILY
Qty: 5 ML | Refills: 11 | Status: SHIPPED | OUTPATIENT
Start: 2023-12-11 | End: 2024-01-19

## 2023-12-11 NOTE — PROGRESS NOTES
Assessment /Plan     For exam results, see Encounter Report.    Pseudophakic bullous keratopathy of left eye  Corneal edema  -     ofloxacin (OCUFLOX) 0.3 % ophthalmic solution; Place 1 drop into the left eye 3 (three) times daily.  Dispense: 5 mL; Refill: 11  -BCL exchange today  -Ocuflox QD refilled today  -Timolol, Latanoprost as prescribed    RTC 1 mo Malina

## 2023-12-11 NOTE — TELEPHONE ENCOUNTER
----- Message from Valdez Clinton OD sent at 12/11/2023 10:40 AM CST -----  Needs Bandage lens exchange in your clinic Mid January

## 2024-01-19 ENCOUNTER — OFFICE VISIT (OUTPATIENT)
Dept: OPTOMETRY | Facility: CLINIC | Age: 89
End: 2024-01-19
Payer: MEDICARE

## 2024-01-19 DIAGNOSIS — H18.20 CORNEAL EDEMA: ICD-10-CM

## 2024-01-19 PROCEDURE — 92012 INTRM OPH EXAM EST PATIENT: CPT | Mod: S$PBB,,, | Performed by: OPTOMETRIST

## 2024-01-19 PROCEDURE — 99213 OFFICE O/P EST LOW 20 MIN: CPT | Mod: PBBFAC | Performed by: OPTOMETRIST

## 2024-01-19 PROCEDURE — 99999 PR PBB SHADOW E&M-EST. PATIENT-LVL III: CPT | Mod: PBBFAC,,, | Performed by: OPTOMETRIST

## 2024-01-19 RX ORDER — OFLOXACIN 3 MG/ML
1 SOLUTION/ DROPS OPHTHALMIC 3 TIMES DAILY
Qty: 5 ML | Refills: 3 | Status: SHIPPED | OUTPATIENT
Start: 2024-01-19 | End: 2024-01-19

## 2024-01-19 RX ORDER — OFLOXACIN 3 MG/ML
1 SOLUTION/ DROPS OPHTHALMIC 3 TIMES DAILY
Qty: 15 ML | Refills: 3 | Status: SHIPPED | OUTPATIENT
Start: 2024-01-19 | End: 2024-04-18

## 2024-01-19 NOTE — PROGRESS NOTES
HPI    BCL Swap  89 y.o. is here for bcl swap - last done in December 2023 with Dr. Clinton .     No complaints, doing better. Will need refill adjustments on drops.   Last edited by Crystal Curry on 1/19/2024 10:03 AM.            Assessment /Plan     For exam results, see Encounter Report.    Corneal edema  -     Discontinue: ofloxacin (OCUFLOX) 0.3 % ophthalmic solution; Place 1 drop into the left eye 3 (three) times daily.  Dispense: 5 mL; Refill: 3  -     Discontinue: ofloxacin (OCUFLOX) 0.3 % ophthalmic solution; Place 1 drop into the left eye 3 (three) times daily.  Dispense: 5 mL; Refill: 3  -     ofloxacin (OCUFLOX) 0.3 % ophthalmic solution; Place 1 drop into the left eye 3 (three) times daily.  Dispense: 15 mL; Refill: 3      Swapped Total 30 BCL  -Continue Ocuflox OS: pt requested Rx be sent in as 90 day supply due to cost with insurance  -Continue Timolol, Latanoprost OU Leader as scheduled  -Continue Ocusoft ALLERGY lid scrubs qd-bid    RTC x 1 month for BCL swap with me

## 2024-02-23 ENCOUNTER — OFFICE VISIT (OUTPATIENT)
Dept: OPTOMETRY | Facility: CLINIC | Age: 89
End: 2024-02-23
Payer: MEDICARE

## 2024-02-23 DIAGNOSIS — H18.20 CORNEAL EDEMA: Primary | ICD-10-CM

## 2024-02-23 PROCEDURE — 99499 UNLISTED E&M SERVICE: CPT | Mod: S$PBB,,, | Performed by: OPTOMETRIST

## 2024-02-25 NOTE — PROGRESS NOTES
HPI    DLS: 01/19/2024 Dr. Radford  Eye meds: Latanoprost qhs OU                      Ofloxacin TID OS                      Timolol qAM OU                       Refresh prn OU                       OcuSoft Allergy prn OU   89 y.o. female is here for 1 month BCL swap. Denies eye pain on today.1-2   weeks ago left eye felt gritty. No noticeable VA changes since last visit.     Last edited by Maxine Radford, OD on 2/23/2024 11:06 AM.            Assessment /Plan     For exam results, see Encounter Report.    Corneal edema      Swapped Total 30 BCL  -Continue Ocuflox OS *pt requests future Rxs be sent in as 90 day supply due to cost with insurance  -Continue Timolol, Latanoprost OU Leader as scheduled  -Continue Ocusoft ALLERGY lid scrubs qd-bid    RTC x 6-8 weeks for BCL swap with me

## 2024-04-19 ENCOUNTER — OFFICE VISIT (OUTPATIENT)
Dept: OPTOMETRY | Facility: CLINIC | Age: 89
End: 2024-04-19
Payer: MEDICARE

## 2024-04-19 DIAGNOSIS — H18.20 CORNEAL EDEMA: Primary | ICD-10-CM

## 2024-04-19 PROCEDURE — 92012 INTRM OPH EXAM EST PATIENT: CPT | Mod: S$PBB,,, | Performed by: OPTOMETRIST

## 2024-04-19 NOTE — PROGRESS NOTES
HPI    AMANUEL: 02/23/2024  Chief complaint (CC) Bandage lens f/u, OS   Glasses? Yes  Contacts? Yes  H/o eye surgery, injections or laser: Cataract Sx and Glaucoma Sx  H/o eye injuryNo  Known eye conditions? Eyelid OS says closed.  Eye gtts? Refresh/Latanoprost, timolol, and ofloxacin  (-) Flashes (-)  Floaters (-) Mucous   (-)  Tearing (-) Itching (-) Burning   (-) Headaches (-) Eye Pain/discomfort (-) Irritation   (-)  Redness (-) Double vision (-) Blurry vision  Diabetic? Pre-Diabetic  A1c? Lab Results       Component                Value               Date                       HGBA1C                   5.6                 08/15/2023           Last edited by Maxine Radford, OD on 4/19/2024 12:16 PM.            Assessment /Plan     For exam results, see Encounter Report.    Corneal edema      Swapped Total 30 BCL  -Continue Ocuflox OS *pt requests future Rxs be sent in as 90 day supply due to cost with insurance  -Continue Timolol, Latanoprost OU Leader as scheduled  -Continue Ocusoft ALLERGY lid scrubs qd-bid    RTC x 6-8 weeks for BCL swap with me

## 2024-06-14 ENCOUNTER — OFFICE VISIT (OUTPATIENT)
Dept: OPTOMETRY | Facility: CLINIC | Age: 89
End: 2024-06-14
Payer: MEDICARE

## 2024-06-14 DIAGNOSIS — H18.20 CORNEAL EDEMA: Primary | ICD-10-CM

## 2024-06-14 DIAGNOSIS — H59.012 PSEUDOPHAKIC BULLOUS KERATOPATHY OF LEFT EYE: ICD-10-CM

## 2024-06-14 PROCEDURE — 92012 INTRM OPH EXAM EST PATIENT: CPT | Mod: S$PBB,,, | Performed by: OPTOMETRIST

## 2024-06-14 RX ORDER — OFLOXACIN 3 MG/ML
SOLUTION/ DROPS OPHTHALMIC
COMMUNITY
Start: 2024-05-30

## 2024-06-18 NOTE — PROGRESS NOTES
HPI    Patient is here today for BCL swap OS. Patient states that OS is irritated   due to dryness. Reports noticeable va changes since last exam. States that   in the morning when she wakes up, she can see OS but as the day goes on,   her upper lid covers OS.   Latanoprost qhs OU   Ofloxacin TID OS   Timolol qAM OU   Refresh prn OU   Last edited by Maxine Radford, OD on 6/14/2024 11:04 AM.            Assessment /Plan     For exam results, see Encounter Report.    Corneal edema    Pseudophakic bullous keratopathy of left eye        Swapped Total 30 BCL  -Continue Ocuflox OS *pt requests future Rxs be sent in as 90 day supply due to cost with insurance  -Continue Timolol, Latanoprost OU Leader as scheduled  -Continue Ocusoft ALLERGY lid scrubs qd-bid    RTC x 4-6 weeks for BCL swap with me

## 2024-07-24 ENCOUNTER — TELEPHONE (OUTPATIENT)
Dept: OPTOMETRY | Facility: CLINIC | Age: 89
End: 2024-07-24
Payer: MEDICARE

## 2024-07-26 ENCOUNTER — OFFICE VISIT (OUTPATIENT)
Dept: OPTOMETRY | Facility: CLINIC | Age: 89
End: 2024-07-26
Payer: MEDICARE

## 2024-07-26 DIAGNOSIS — H18.20 CORNEAL EDEMA: Primary | ICD-10-CM

## 2024-07-26 DIAGNOSIS — H59.012 PSEUDOPHAKIC BULLOUS KERATOPATHY OF LEFT EYE: ICD-10-CM

## 2024-07-26 NOTE — PROGRESS NOTES
Assessment /Plan     For exam results, see Encounter Report.    Corneal edema    Pseudophakic bullous keratopathy of left eye      Swapped Total 30 BCL  -Continue Ocuflox OS *pt requests future Rxs be sent in as 90 day supply due to cost with insurance  -Continue Timolol, Latanoprost OU Leader as scheduled  -Continue Ocusoft ALLERGY lid scrubs qd-bid    RTC x 4-6 weeks for BCL swap with me

## 2024-08-08 RX ORDER — OFLOXACIN 3 MG/ML
SOLUTION/ DROPS OPHTHALMIC
Qty: 15 ML | Refills: 11 | Status: SHIPPED | OUTPATIENT
Start: 2024-08-08

## 2024-09-05 ENCOUNTER — OFFICE VISIT (OUTPATIENT)
Dept: OPTOMETRY | Facility: CLINIC | Age: 89
End: 2024-09-05
Payer: MEDICARE

## 2024-09-05 DIAGNOSIS — H18.20 CORNEAL EDEMA: Primary | ICD-10-CM

## 2024-09-05 DIAGNOSIS — H59.012 PSEUDOPHAKIC BULLOUS KERATOPATHY OF LEFT EYE: ICD-10-CM

## 2024-09-05 PROCEDURE — 92012 INTRM OPH EXAM EST PATIENT: CPT | Mod: S$PBB,,, | Performed by: OPTOMETRIST

## 2024-09-05 PROCEDURE — 99212 OFFICE O/P EST SF 10 MIN: CPT | Mod: PBBFAC | Performed by: OPTOMETRIST

## 2024-09-05 PROCEDURE — 99999 PR PBB SHADOW E&M-EST. PATIENT-LVL II: CPT | Mod: PBBFAC,,, | Performed by: OPTOMETRIST

## 2024-09-05 NOTE — PROGRESS NOTES
HPI    Patient is here today for BCL exchange. No visual complaints at this time.   No pain or discomfort.  Latanoprost 1x at night OU  Ofloxacin TID OS   Timolol 1x in the morning OU  Refresh PRN OU   Ocusoft Lid Scrubs PRN OU  Last edited by Maxine Radford, OD on 9/5/2024 10:31 AM.            Assessment /Plan     For exam results, see Encounter Report.    Corneal edema    Pseudophakic bullous keratopathy of left eye      Swapped Total 30 BCL  -Continue Ocuflox OS *pt requests future Rxs be sent in as 90 day supply due to cost with insurance  -Continue Timolol, Latanoprost OU Leader as scheduled  -Continue Ocusoft ALLERGY lid scrubs qd-bid     RTC x 4-6 weeks for BCL swap with me

## 2024-10-14 ENCOUNTER — OFFICE VISIT (OUTPATIENT)
Dept: OPTOMETRY | Facility: CLINIC | Age: 89
End: 2024-10-14
Payer: MEDICARE

## 2024-10-14 DIAGNOSIS — H59.012 PSEUDOPHAKIC BULLOUS KERATOPATHY OF LEFT EYE: ICD-10-CM

## 2024-10-14 DIAGNOSIS — H18.20 CORNEAL EDEMA: Primary | ICD-10-CM

## 2024-10-14 PROCEDURE — 99213 OFFICE O/P EST LOW 20 MIN: CPT | Mod: PBBFAC | Performed by: OPTOMETRIST

## 2024-10-14 PROCEDURE — 92012 INTRM OPH EXAM EST PATIENT: CPT | Mod: S$PBB,,, | Performed by: OPTOMETRIST

## 2024-10-14 PROCEDURE — 99999 PR PBB SHADOW E&M-EST. PATIENT-LVL III: CPT | Mod: PBBFAC,,, | Performed by: OPTOMETRIST

## 2024-10-14 NOTE — PROGRESS NOTES
HPI    Patient is here today for BCL swap OS. Denies pain.   Latanoprost 1x at night OU  Ofloxacin TID OS   Timolol 1x in the morning OU  Refresh PRN OU   Ocusoft Lid Scrubs PRN OU  Last edited by Maxine Radford, OD on 10/14/2024 10:00 AM.            Assessment /Plan     For exam results, see Encounter Report.    Corneal edema    Pseudophakic bullous keratopathy of left eye      Swapped Total 30 BCL  -Continue Ocuflox OS *pt requests future Rxs be sent in as 90 day supply due to cost with insurance  -Continue Timolol, Latanoprost OU Leader as scheduled  -Continue Ocusoft ALLERGY lid scrubs qd-bid     RTC x 4-6 weeks for BCL swap with me

## 2024-11-20 ENCOUNTER — OFFICE VISIT (OUTPATIENT)
Dept: OPTOMETRY | Facility: CLINIC | Age: 89
End: 2024-11-20
Payer: MEDICARE

## 2024-11-20 DIAGNOSIS — H18.20 CORNEAL EDEMA: Primary | ICD-10-CM

## 2024-11-20 DIAGNOSIS — H59.012 PSEUDOPHAKIC BULLOUS KERATOPATHY OF LEFT EYE: ICD-10-CM

## 2024-11-20 PROCEDURE — 92012 INTRM OPH EXAM EST PATIENT: CPT | Mod: S$PBB,,, | Performed by: OPTOMETRIST

## 2024-11-20 RX ORDER — OFLOXACIN 3 MG/ML
1 SOLUTION/ DROPS OPHTHALMIC 3 TIMES DAILY
Qty: 15 ML | Refills: 3 | Status: SHIPPED | OUTPATIENT
Start: 2024-11-20 | End: 2025-11-20

## 2024-11-20 NOTE — PROGRESS NOTES
HPI    Pt is here today for BCL Switch. Denies pain. States that when she woke up   this morning, she could see OS eye ball and she usually isn't able to see   it.   Latanoprost 1x at night OU  Ofloxacin TID OS   Timolol 1x in the morning OU  Refresh PRN OU   Ocusoft Lid Scrubs PRN OU  Last edited by Maxine Radford, OD on 11/20/2024 10:24 AM.            Assessment /Plan     For exam results, see Encounter Report.    Corneal edema  -     ofloxacin (OCUFLOX) 0.3 % ophthalmic solution; Place 1 drop into the left eye 3 (three) times daily.  Dispense: 15 mL; Refill: 3    Pseudophakic bullous keratopathy of left eye  -     ofloxacin (OCUFLOX) 0.3 % ophthalmic solution; Place 1 drop into the left eye 3 (three) times daily.  Dispense: 15 mL; Refill: 3      Swapped Total 30 BCL  -Continue Ocuflox OS *pt requests future Rxs be sent in as 90 day supply due to cost with insurance  -Continue Timolol, Latanoprost OU Leader as scheduled  -Continue Ocusoft ALLERGY lid scrubs qd-bid     RTC x 4-6 weeks for BCL swap with me

## 2024-12-30 ENCOUNTER — OFFICE VISIT (OUTPATIENT)
Dept: OPTOMETRY | Facility: CLINIC | Age: 89
End: 2024-12-30
Payer: MEDICARE

## 2024-12-30 DIAGNOSIS — H40.1114 PRIMARY OPEN ANGLE GLAUCOMA (POAG) OF RIGHT EYE, INDETERMINATE STAGE: ICD-10-CM

## 2024-12-30 DIAGNOSIS — H18.20 CORNEAL EDEMA: Primary | ICD-10-CM

## 2024-12-30 DIAGNOSIS — H02.88A MEIBOMIAN GLAND DYSFUNCTION (MGD), BILATERAL, BOTH UPPER AND LOWER LIDS: ICD-10-CM

## 2024-12-30 DIAGNOSIS — H02.88B MEIBOMIAN GLAND DYSFUNCTION (MGD), BILATERAL, BOTH UPPER AND LOWER LIDS: ICD-10-CM

## 2024-12-30 DIAGNOSIS — H59.012 PSEUDOPHAKIC BULLOUS KERATOPATHY OF LEFT EYE: ICD-10-CM

## 2024-12-30 PROCEDURE — 92012 INTRM OPH EXAM EST PATIENT: CPT | Mod: S$PBB,,, | Performed by: OPTOMETRIST

## 2024-12-30 RX ORDER — LATANOPROST 50 UG/ML
1 SOLUTION/ DROPS OPHTHALMIC NIGHTLY
Qty: 7.5 ML | Refills: 4 | Status: SHIPPED | OUTPATIENT
Start: 2024-12-30

## 2024-12-30 NOTE — PROGRESS NOTES
HPI    Pt is here today for BCL swap OS. Denies pain.   Last edited by Molly Erickson on 12/30/2024 11:09 AM.            Assessment /Plan     For exam results, see Encounter Report.    Corneal edema    Pseudophakic bullous keratopathy of left eye    Meibomian gland dysfunction (MGD), bilateral, both upper and lower lids    Primary open angle glaucoma (POAG) of right eye, indeterminate stage  -     latanoprost 0.005 % ophthalmic solution; Place 1 drop into both eyes every evening.  Dispense: 7.5 mL; Refill: 4      Swapped Total 30 BCL  -Continue Ocuflox OS *pt requests future Rxs be sent in as 90 day supply due to cost with insurance  -Continue Timolol, Latanoprost OU *Refilled today--> pt transferring all care to [facility] so I will take over glaucoma care; request records from Dr. Mai; pt due for IOP check with DFE mid April 2025  -Continue Ocusoft ALLERGY lid scrubs qd-bid     RTC x 4-6 weeks for BCL swap with me

## 2025-02-12 ENCOUNTER — OFFICE VISIT (OUTPATIENT)
Dept: OPTOMETRY | Facility: CLINIC | Age: OVER 89
End: 2025-02-12
Payer: MEDICARE

## 2025-02-12 DIAGNOSIS — H18.20 CORNEAL EDEMA: Primary | ICD-10-CM

## 2025-02-12 DIAGNOSIS — H59.012 PSEUDOPHAKIC BULLOUS KERATOPATHY OF LEFT EYE: ICD-10-CM

## 2025-02-12 PROCEDURE — 99213 OFFICE O/P EST LOW 20 MIN: CPT | Mod: S$PBB,,, | Performed by: OPTOMETRIST

## 2025-02-12 PROCEDURE — G2211 COMPLEX E/M VISIT ADD ON: HCPCS | Mod: S$PBB,,, | Performed by: OPTOMETRIST

## 2025-02-12 NOTE — PROGRESS NOTES
HPI    Patient is here today for BCL swap OS. Denies pain.   Last edited by Molly Erickson on 2/12/2025 11:03 AM.            Assessment /Plan     For exam results, see Encounter Report.    Corneal edema    Pseudophakic bullous keratopathy of left eye      Swapped Total 30 BCL  -Continue Ocuflox OS *pt requests future Rxs be sent in as 90 day supply due to cost with insurance  -Continue Timolol, Latanoprost OU *Refilled last  visit--> pt transferring all care to [facility] so I will take over glaucoma care; request records from Dr. Mai; pt due for IOP check with DFE mid April 2025  -Continue Ocusoft ALLERGY lid scrubs qd-bid     RTC x 4-6 weeks for BCL swap with me    Today's visit is associated with current and anticipated ongoing medical care related to this patient's single serious/complex condition (cornea). Follow up is to be continued indefinitely to monitor the condition.

## 2025-03-03 DIAGNOSIS — H18.20 CORNEAL EDEMA: ICD-10-CM

## 2025-03-03 DIAGNOSIS — H59.012 PSEUDOPHAKIC BULLOUS KERATOPATHY OF LEFT EYE: ICD-10-CM

## 2025-03-03 DIAGNOSIS — H40.1114 PRIMARY OPEN ANGLE GLAUCOMA (POAG) OF RIGHT EYE, INDETERMINATE STAGE: ICD-10-CM

## 2025-03-03 RX ORDER — OFLOXACIN 3 MG/ML
SOLUTION/ DROPS OPHTHALMIC
Qty: 15 ML | Refills: 0 | Status: SHIPPED | OUTPATIENT
Start: 2025-03-03

## 2025-03-03 RX ORDER — TIMOLOL MALEATE 5 MG/ML
SOLUTION/ DROPS OPHTHALMIC
Qty: 15 ML | Refills: 0 | Status: SHIPPED | OUTPATIENT
Start: 2025-03-03

## 2025-03-03 RX ORDER — LATANOPROST 50 UG/ML
SOLUTION/ DROPS OPHTHALMIC
Qty: 7.5 ML | Refills: 0 | Status: SHIPPED | OUTPATIENT
Start: 2025-03-03

## 2025-03-26 ENCOUNTER — OFFICE VISIT (OUTPATIENT)
Dept: OPTOMETRY | Facility: CLINIC | Age: OVER 89
End: 2025-03-26
Payer: MEDICARE

## 2025-03-26 DIAGNOSIS — H18.20 CORNEAL EDEMA: Primary | ICD-10-CM

## 2025-03-26 DIAGNOSIS — H59.012 PSEUDOPHAKIC BULLOUS KERATOPATHY OF LEFT EYE: ICD-10-CM

## 2025-03-26 PROCEDURE — 99213 OFFICE O/P EST LOW 20 MIN: CPT | Mod: S$PBB,,, | Performed by: OPTOMETRIST

## 2025-03-26 PROCEDURE — G2211 COMPLEX E/M VISIT ADD ON: HCPCS | Mod: S$PBB,,, | Performed by: OPTOMETRIST

## 2025-03-26 NOTE — PROGRESS NOTES
HPI    Patient is here today for BCL swap. Denies pain. States that she has   noticed that she is more reliant on ATs.   Ocuflox TID OS  Latanoprost QHS OU   Timolol BID OU   Last edited by Maxine Radford, OD on 3/26/2025 11:25 AM.            Assessment /Plan     For exam results, see Encounter Report.    Corneal edema    Pseudophakic bullous keratopathy of left eye    Meibomian gland dysfunction (MGD), bilateral, both upper and lower lids    Primary open angle glaucoma (POAG) of right eye, indeterminate stage      Swapped Total 30 BCL  -Continue Ocuflox OS *pt requests future Rxs be sent in as 90 day supply due to cost with insurance  -Continue Timolol, Latanoprost OU--> pt transferring all care to [facility] so I will take over glaucoma care; request records from Dr. Mai; pt due for IOP check with DFE mid April 2025  -Continue Ocusoft ALLERGY lid scrubs qd-bid     RTC x 4-6 weeks for BCL swap with me, IOP check with DFE at this visit also    Today's visit is associated with current and anticipated ongoing medical care related to this patient's single serious/complex condition (cornea). Follow up is to be continued indefinitely to monitor the condition.

## 2025-04-16 ENCOUNTER — TELEPHONE (OUTPATIENT)
Dept: OPTOMETRY | Facility: CLINIC | Age: OVER 89
End: 2025-04-16
Payer: MEDICARE

## 2025-04-16 NOTE — TELEPHONE ENCOUNTER
----- Message from Maxine Radford sent at 4/16/2025 10:31 AM CDT -----  Regarding: RE: Reschedule Existing Appointment  Contact: [phone]  We can do Thurs May 15 at 11 AM and block the 3:20 for her BCL  ----- Message -----  From: Molly Erickson  Sent: 4/16/2025   9:00 AM CDT  To: Maxine Radford OD  Subject: FW: Reschedule Existing Appointment              I called her but I don't know where we can fit her. I told her I would call her back after I spoke to you  ----- Message -----  From: Sue Mckinley  Sent: 4/15/2025   4:54 PM CDT  To: Molly Erickson  Subject: FW: Reschedule Existing Appointment                ----- Message -----  From: Mata Solano  Sent: 4/15/2025   4:53 PM CDT  To: Maxine Radford Staff  Subject: Reschedule Existing Appointment                  Reschedule Existing Appointment Current appt date: 4/30/2025 Type of appt :  annual with BCL georiga Physician: Dr. Radford Reason for rescheduling: Needs to reschedule her appt due to road closures around her because of [place]. Trying to reschedule for early May Caller: Jojo Contact Preference:  [phone]

## 2025-04-30 ENCOUNTER — OFFICE VISIT (OUTPATIENT)
Dept: OPTOMETRY | Facility: CLINIC | Age: OVER 89
End: 2025-04-30
Payer: MEDICARE

## 2025-04-30 ENCOUNTER — TELEPHONE (OUTPATIENT)
Dept: OPTOMETRY | Facility: CLINIC | Age: OVER 89
End: 2025-04-30
Payer: MEDICARE

## 2025-04-30 DIAGNOSIS — Z46.0 FITTING AND ADJUSTMENT OF SPECTACLES AND CONTACT LENSES: Primary | ICD-10-CM

## 2025-04-30 DIAGNOSIS — H40.1111 PRIMARY OPEN ANGLE GLAUCOMA OF RIGHT EYE, MILD STAGE: Primary | ICD-10-CM

## 2025-04-30 DIAGNOSIS — H18.20 CORNEAL EDEMA: ICD-10-CM

## 2025-04-30 DIAGNOSIS — H59.012 PSEUDOPHAKIC BULLOUS KERATOPATHY OF LEFT EYE: ICD-10-CM

## 2025-04-30 DIAGNOSIS — H02.88A MEIBOMIAN GLAND DYSFUNCTION (MGD), BILATERAL, BOTH UPPER AND LOWER LIDS: ICD-10-CM

## 2025-04-30 DIAGNOSIS — H02.88B MEIBOMIAN GLAND DYSFUNCTION (MGD), BILATERAL, BOTH UPPER AND LOWER LIDS: ICD-10-CM

## 2025-04-30 PROCEDURE — 99499 UNLISTED E&M SERVICE: CPT | Mod: S$PBB,,, | Performed by: OPTOMETRIST

## 2025-04-30 PROCEDURE — 99999 PR PBB SHADOW E&M-EST. PATIENT-LVL III: CPT | Mod: PBBFAC,,, | Performed by: OPTOMETRIST

## 2025-04-30 PROCEDURE — 99214 OFFICE O/P EST MOD 30 MIN: CPT | Mod: S$PBB,,, | Performed by: OPTOMETRIST

## 2025-04-30 PROCEDURE — 99213 OFFICE O/P EST LOW 20 MIN: CPT | Mod: PBBFAC | Performed by: OPTOMETRIST

## 2025-04-30 PROCEDURE — G2211 COMPLEX E/M VISIT ADD ON: HCPCS | Mod: S$PBB,,, | Performed by: OPTOMETRIST

## 2025-04-30 NOTE — PROGRESS NOTES
[facility]    Pt is here today for routine eye exam. Denies pain/discomfort.  DLS: 4/30/2025 Dr. Radford  (-)Flashes   (-)Floaters   (-)Diplopia   (-)Headaches   (-)Itching   (-)Tearing  (-)Burning  (+)Dryness   (-)Photophobia  (+)Glare   (-)Blurred [facility]  Past Eye Sx: PC IOL OU                       Glaucoma Sx/gtts taking  Timolol qAM OU, Latanoprost qhs OU, Oflox tid OS only  OTC Refresh PRN OU   Last edited by Maxine Radford, OD on 4/30/2025  9:19 AM.            Assessment /Plan     For exam results, see Encounter Report.    Primary open angle glaucoma of right eye, mild stage    Corneal edema    Pseudophakic bullous keratopathy of left eye    Meibomian gland dysfunction (MGD), bilateral, both upper and lower lids      1. Continue Timolol qAM, Latanoprost qhs OU--pt did not need refills today. Pt transferring all care to [facility] so I will manage pt's glc. Previous records from Dr. Mai staged glaucoma as mild. Stable findings today.    2-4.  Swapped Total 30 BCL  -Continue Ocuflox OS *pt requests future Rxs be sent in as 90 day supply due to cost with insurance  -Continue Ocusoft ALLERGY lid scrubs qd-bid     RTC x 4-6 weeks for BCL swap with me, refraction at this visit also*  RTC in 1 year for annual eye exam       Today's visit is associated with current and anticipated ongoing medical care related to this patient's single serious/complex condition (cornea). Follow up is to be continued indefinitely to monitor the condition.

## 2025-04-30 NOTE — TELEPHONE ENCOUNTER
----- Message from Mata sent at 4/30/2025  8:21 AM CDT -----  Regarding: Consult/Advisory  Contact: [phone]  Consult/Advisory Name Of Caller: Jojo  Contact Preference:  [phone] Nature of call: Pt is stuck in traffic and will be a little late. She asks to please not cancel her appt.

## 2025-05-04 RX ORDER — TIMOLOL MALEATE 5 MG/ML
SOLUTION/ DROPS OPHTHALMIC
Qty: 15 ML | Refills: 3 | Status: SHIPPED | OUTPATIENT
Start: 2025-05-04

## 2025-05-29 DIAGNOSIS — H40.1114 PRIMARY OPEN ANGLE GLAUCOMA (POAG) OF RIGHT EYE, INDETERMINATE STAGE: ICD-10-CM

## 2025-05-30 RX ORDER — LATANOPROST 50 UG/ML
SOLUTION/ DROPS OPHTHALMIC
Qty: 7.5 ML | Refills: 3 | Status: SHIPPED | OUTPATIENT
Start: 2025-05-30

## 2025-06-10 DIAGNOSIS — H18.20 CORNEAL EDEMA: ICD-10-CM

## 2025-06-10 DIAGNOSIS — H59.012 PSEUDOPHAKIC BULLOUS KERATOPATHY OF LEFT EYE: ICD-10-CM

## 2025-06-10 RX ORDER — OFLOXACIN 3 MG/ML
SOLUTION/ DROPS OPHTHALMIC
Qty: 15 ML | Refills: 0 | Status: SHIPPED | OUTPATIENT
Start: 2025-06-10

## 2025-06-11 ENCOUNTER — OFFICE VISIT (OUTPATIENT)
Dept: OPTOMETRY | Facility: CLINIC | Age: OVER 89
End: 2025-06-11
Payer: MEDICARE

## 2025-06-11 DIAGNOSIS — H18.20 CORNEAL EDEMA: ICD-10-CM

## 2025-06-11 DIAGNOSIS — H40.1114 PRIMARY OPEN ANGLE GLAUCOMA (POAG) OF RIGHT EYE, INDETERMINATE STAGE: ICD-10-CM

## 2025-06-11 DIAGNOSIS — H59.012 PSEUDOPHAKIC BULLOUS KERATOPATHY OF LEFT EYE: Primary | ICD-10-CM

## 2025-06-11 PROCEDURE — 99499 UNLISTED E&M SERVICE: CPT | Mod: S$PBB,,, | Performed by: OPTOMETRIST

## 2025-06-11 PROCEDURE — 99212 OFFICE O/P EST SF 10 MIN: CPT | Mod: PBBFAC | Performed by: OPTOMETRIST

## 2025-06-11 PROCEDURE — 99999 PR PBB SHADOW E&M-EST. PATIENT-LVL II: CPT | Mod: PBBFAC,,, | Performed by: OPTOMETRIST

## 2025-06-11 PROCEDURE — 92015 DETERMINE REFRACTIVE STATE: CPT | Mod: ,,, | Performed by: OPTOMETRIST

## 2025-06-11 RX ORDER — LATANOPROST 50 UG/ML
1 SOLUTION/ DROPS OPHTHALMIC NIGHTLY
Qty: 7.5 ML | Refills: 3 | Status: SHIPPED | OUTPATIENT
Start: 2025-06-11 | End: 2025-09-09

## 2025-06-11 NOTE — PROGRESS NOTES
HPI    Patient is here today for BCL swap and refraction. Denies pain but reports   that she did have slight discomfort OS after last visit. Patient states   that she has been having trouble getting in her prescriptions for her   drops. The pharmacy states that they are waiting on a response from doctor   but she knows that it was sent in by doctor already.  Last edited by Molly Erickson on 6/11/2025 10:53 AM.            Assessment /Plan     For exam results, see Encounter Report.    Pseudophakic bullous keratopathy of left eye    Corneal edema    Primary open angle glaucoma (POAG) of right eye, indeterminate stage  -     latanoprost 0.005 % ophthalmic solution; Place 1 drop into both eyes every evening.  Dispense: 7.5 mL; Refill: 3      1-3.  Swapped Total 30 BCL  -Continue Ocuflox OS *pt requests future Rxs be sent in as 90 day supply due to cost with insurance  -Continue Timolol, Latanoprost OU--> pt transferring all care to [facility] so I will take over glaucoma care; refills today  -Continue Ocusoft ALLERGY lid scrubs qd-bid CONTINUE REFRESH    Eyeglass Final Rx       Eyeglass Final Rx         Sphere Cylinder Axis Add    Right -0.75 +3.25 175 +3.00    Left Balance   +3.00      Type: PAL    Expiration Date: 6/11/2026   Impact resistant lens material                  RTC x 6 weeks for BCL swap with me

## 2025-07-23 ENCOUNTER — OFFICE VISIT (OUTPATIENT)
Dept: OPTOMETRY | Facility: CLINIC | Age: OVER 89
End: 2025-07-23
Payer: MEDICARE

## 2025-07-23 DIAGNOSIS — H59.012 PSEUDOPHAKIC BULLOUS KERATOPATHY OF LEFT EYE: Primary | ICD-10-CM

## 2025-07-23 DIAGNOSIS — H18.20 CORNEAL EDEMA: ICD-10-CM

## 2025-07-23 PROCEDURE — 99999 PR PBB SHADOW E&M-EST. PATIENT-LVL III: CPT | Mod: PBBFAC,,, | Performed by: OPTOMETRIST

## 2025-07-23 PROCEDURE — 99213 OFFICE O/P EST LOW 20 MIN: CPT | Mod: PBBFAC | Performed by: OPTOMETRIST

## 2025-07-23 PROCEDURE — 99213 OFFICE O/P EST LOW 20 MIN: CPT | Mod: S$PBB,,, | Performed by: OPTOMETRIST

## 2025-07-23 PROCEDURE — G2211 COMPLEX E/M VISIT ADD ON: HCPCS | Mod: ,,, | Performed by: OPTOMETRIST

## 2025-07-23 NOTE — PROGRESS NOTES
HPI    Patient is here today for a BCL swap OS. Denies pain. States that she was   having irritation and tearing OS about two weeks ago. States that it has   cleared since then.   Last edited by Molly Erickson on 7/23/2025 11:05 AM.            Assessment /Plan     For exam results, see Encounter Report.    Pseudophakic bullous keratopathy of left eye    Corneal edema      Swapped Total 30 BCL  -Continue Ocuflox OS *pt requests future Rxs be sent in as 90 day supply due to cost with insurance  -Continue Ocusoft ALLERGY lid scrubs QD     RTC x 6 weeks for BCL swap with me,     Today's visit is associated with current and anticipated ongoing medical care related to this patient's single serious/complex condition (cornea). Follow up is to be continued indefinitely to monitor the condition.

## 2025-08-12 DIAGNOSIS — H59.012 PSEUDOPHAKIC BULLOUS KERATOPATHY OF LEFT EYE: ICD-10-CM

## 2025-08-12 DIAGNOSIS — H18.20 CORNEAL EDEMA: ICD-10-CM

## 2025-08-12 RX ORDER — OFLOXACIN 3 MG/ML
SOLUTION/ DROPS OPHTHALMIC
Qty: 15 ML | Refills: 0 | Status: SHIPPED | OUTPATIENT
Start: 2025-08-12

## 2025-09-04 ENCOUNTER — OFFICE VISIT (OUTPATIENT)
Dept: OPTOMETRY | Facility: CLINIC | Age: OVER 89
End: 2025-09-04
Payer: MEDICARE

## 2025-09-04 DIAGNOSIS — H18.20 CORNEAL EDEMA: Primary | ICD-10-CM

## 2025-09-04 DIAGNOSIS — H59.012 PSEUDOPHAKIC BULLOUS KERATOPATHY OF LEFT EYE: ICD-10-CM

## 2025-09-04 PROCEDURE — 99213 OFFICE O/P EST LOW 20 MIN: CPT | Mod: S$PBB,,, | Performed by: OPTOMETRIST

## 2025-09-04 PROCEDURE — G2211 COMPLEX E/M VISIT ADD ON: HCPCS | Mod: ,,, | Performed by: OPTOMETRIST
